# Patient Record
Sex: MALE | Race: WHITE | NOT HISPANIC OR LATINO | Employment: FULL TIME | URBAN - NONMETROPOLITAN AREA
[De-identification: names, ages, dates, MRNs, and addresses within clinical notes are randomized per-mention and may not be internally consistent; named-entity substitution may affect disease eponyms.]

---

## 2022-12-16 ENCOUNTER — TRANSFERRED RECORDS (OUTPATIENT)
Dept: HEALTH INFORMATION MANAGEMENT | Facility: OTHER | Age: 57
End: 2022-12-16

## 2022-12-20 ENCOUNTER — OFFICE VISIT (OUTPATIENT)
Dept: PEDIATRICS | Facility: OTHER | Age: 57
End: 2022-12-20
Attending: INTERNAL MEDICINE
Payer: COMMERCIAL

## 2022-12-20 VITALS
DIASTOLIC BLOOD PRESSURE: 82 MMHG | WEIGHT: 184 LBS | TEMPERATURE: 97.7 F | HEIGHT: 71 IN | OXYGEN SATURATION: 97 % | SYSTOLIC BLOOD PRESSURE: 138 MMHG | RESPIRATION RATE: 20 BRPM | BODY MASS INDEX: 25.76 KG/M2 | HEART RATE: 92 BPM

## 2022-12-20 DIAGNOSIS — Z23 NEED FOR VACCINATION: ICD-10-CM

## 2022-12-20 DIAGNOSIS — K57.30 DIVERTICULOSIS OF LARGE INTESTINE WITHOUT HEMORRHAGE: ICD-10-CM

## 2022-12-20 DIAGNOSIS — K57.32 DIVERTICULITIS OF LARGE INTESTINE WITHOUT PERFORATION OR ABSCESS WITHOUT BLEEDING: Primary | ICD-10-CM

## 2022-12-20 PROCEDURE — 90471 IMMUNIZATION ADMIN: CPT | Performed by: INTERNAL MEDICINE

## 2022-12-20 PROCEDURE — 90682 RIV4 VACC RECOMBINANT DNA IM: CPT | Performed by: INTERNAL MEDICINE

## 2022-12-20 PROCEDURE — 99203 OFFICE O/P NEW LOW 30 MIN: CPT | Mod: 25 | Performed by: INTERNAL MEDICINE

## 2022-12-20 RX ORDER — CIPROFLOXACIN 500 MG/1
500 TABLET, FILM COATED ORAL 2 TIMES DAILY
COMMUNITY
End: 2023-02-21

## 2022-12-20 RX ORDER — CHOLECALCIFEROL (VITAMIN D3) 125 MCG
1 CAPSULE ORAL DAILY
COMMUNITY

## 2022-12-20 RX ORDER — METRONIDAZOLE 500 MG/1
500 TABLET ORAL 3 TIMES DAILY
COMMUNITY
End: 2023-02-21

## 2022-12-20 ASSESSMENT — PAIN SCALES - GENERAL: PAINLEVEL: MILD PAIN (2)

## 2022-12-20 NOTE — NURSING NOTE
"Chief Complaint   Patient presents with     Abdominal Pain     LLQ- recent DX: Diverticulitis 12/16/22.         Initial /82   Pulse 92   Temp 97.7  F (36.5  C) (Tympanic)   Resp 20   Ht 1.791 m (5' 10.5\")   Wt 83.5 kg (184 lb)   SpO2 97%   BMI 26.03 kg/m   Estimated body mass index is 26.03 kg/m  as calculated from the following:    Height as of this encounter: 1.791 m (5' 10.5\").    Weight as of this encounter: 83.5 kg (184 lb).         Norma J. Gosselin, SAMARA   "

## 2022-12-20 NOTE — PATIENT INSTRUCTIONS
-- Complete antibiotics   -- Yogurt daily   -- Fiber daily for life   -- Slowly liberalize diet   -- Schedule colonoscopy about a month out or so

## 2022-12-20 NOTE — PROGRESS NOTES
"Assessment & Plan   1. Diverticulitis of large intestine without perforation or abscess without bleeding  - Adult GI  Referral - Procedure Only; Future    2. Diverticulosis of large intestine without hemorrhage  I recommend a follow-up colonoscopy when symptoms have resolved to look for the possibility of a colon cancer at the site of diverticulitis.  - Adult GI  Referral - Procedure Only; Future    3. Need for vaccination  - INFLUENZA VACCINE 50-64 OR 18-64 W/EGG ALLERGY (FLUBLOK)      Patient Instructions    -- Complete antibiotics   -- Yogurt daily   -- Fiber daily for life   -- Slowly liberalize diet   -- Schedule colonoscopy about a month out or so        Signed, Jos Nunez MD, FAAP, FACP  Internal Medicine & Pediatrics    Subjective   Cliff Alvarez is a 57 year old male who presents for ER follow-up.  He was down in [place] on a hunting trip.  He developed abdominal pain.  He was seen in an emergency room in [place] on the way home.  He was diagnosed with diverticulitis.  He had a CAT scan.  Those records are not available for my review.  He is on antibiotics and feels like he is recovering.  He was told to follow-up with primary care.    Objective   Vitals: /82   Pulse 92   Temp 97.7  F (36.5  C) (Tympanic)   Resp 20   Ht 1.791 m (5' 10.5\")   Wt 83.5 kg (184 lb)   SpO2 97%   BMI 26.03 kg/m      Abdomen: Soft, nondistended.  Mild diffuse tenderness without rebound or guarding.    Review and Analysis of Data   I personally reviewed the following:  External notes: No  Results: No  Use of an independent historian: No  Independent review of a test performed by another physician: No  Discussion of management with another physician: No  Moderate risk of morbidity from additional diagnostic testing and/or treatment.      "

## 2023-02-15 DIAGNOSIS — Z87.19 HISTORY OF DIVERTICULITIS: Primary | ICD-10-CM

## 2023-02-15 NOTE — TELEPHONE ENCOUNTER
Screening Questions for the Scheduling of Screening Colonoscopies   (If Colonoscopy is diagnostic, Provider should review the chart before scheduling.)  Are you younger than 50 or older than 80?  NO  Do you take aspirin or fish oil?  NO (if yes, tell patient to stop 1 week prior to Colonoscopy)  Do you take warfarin (Coumadin), clopidogrel (Plavix), apixaban (Eliquis), dabigatram (Pradaxa), rivaroxaban (Xarelto) or any blood thinner? NO  Do you use oxygen at home?  NO  Do you have kidney disease? NO  Are you on dialysis? NO  Have you had a stroke or heart attack in the last year? NO  Have you had a stent in your heart or any blood vessel in the last year? NO  Have you had a transplant of any organ? NO  Have you had a colonoscopy or upper endoscopy (EGD) before? YES         When?  6 YEARS [facility]  Date of scheduled Colonoscopy. 02/28/2023  Provider [facility]  Shayna Toledo on 2/15/2023 at 8:04 AM

## 2023-02-16 RX ORDER — BISACODYL 5 MG
TABLET, DELAYED RELEASE (ENTERIC COATED) ORAL
Qty: 2 TABLET | Refills: 0 | Status: ON HOLD | OUTPATIENT
Start: 2023-02-16 | End: 2023-02-28

## 2023-02-16 RX ORDER — POLYETHYLENE GLYCOL 3350, SODIUM CHLORIDE, SODIUM BICARBONATE, POTASSIUM CHLORIDE 420; 11.2; 5.72; 1.48 G/4L; G/4L; G/4L; G/4L
4000 POWDER, FOR SOLUTION ORAL ONCE
Qty: 4000 ML | Refills: 0 | Status: SHIPPED | OUTPATIENT
Start: 2023-02-16 | End: 2023-02-16

## 2023-02-28 ENCOUNTER — ANESTHESIA EVENT (OUTPATIENT)
Dept: SURGERY | Facility: OTHER | Age: 58
End: 2023-02-28
Payer: COMMERCIAL

## 2023-02-28 ENCOUNTER — ANESTHESIA (OUTPATIENT)
Dept: SURGERY | Facility: OTHER | Age: 58
End: 2023-02-28
Payer: COMMERCIAL

## 2023-02-28 ENCOUNTER — HOSPITAL ENCOUNTER (OUTPATIENT)
Facility: OTHER | Age: 58
Discharge: HOME | End: 2023-02-28
Attending: SURGERY | Admitting: SURGERY
Payer: COMMERCIAL

## 2023-02-28 VITALS
BODY MASS INDEX: 25.46 KG/M2 | WEIGHT: 180 LBS | RESPIRATION RATE: 16 BRPM | DIASTOLIC BLOOD PRESSURE: 99 MMHG | TEMPERATURE: 96.5 F | HEART RATE: 56 BPM | OXYGEN SATURATION: 98 % | SYSTOLIC BLOOD PRESSURE: 127 MMHG

## 2023-02-28 PROCEDURE — 258N000003 HC RX IP 258 OP 636: Performed by: SURGERY

## 2023-02-28 PROCEDURE — G0121 COLON CA SCRN NOT HI RSK IND: HCPCS | Performed by: SURGERY

## 2023-02-28 PROCEDURE — 999N000010 HC STATISTIC ANES STAT CODE-CRNA PER MINUTE: Performed by: SURGERY

## 2023-02-28 PROCEDURE — 45378 DIAGNOSTIC COLONOSCOPY: CPT | Performed by: NURSE ANESTHETIST, CERTIFIED REGISTERED

## 2023-02-28 PROCEDURE — 250N000011 HC RX IP 250 OP 636: Performed by: NURSE ANESTHETIST, CERTIFIED REGISTERED

## 2023-02-28 PROCEDURE — 45378 DIAGNOSTIC COLONOSCOPY: CPT | Performed by: SURGERY

## 2023-02-28 PROCEDURE — 250N000009 HC RX 250: Performed by: NURSE ANESTHETIST, CERTIFIED REGISTERED

## 2023-02-28 RX ORDER — PROPOFOL 10 MG/ML
INJECTION, EMULSION INTRAVENOUS CONTINUOUS PRN
Status: DISCONTINUED | OUTPATIENT
Start: 2023-02-28 | End: 2023-02-28

## 2023-02-28 RX ORDER — PROPOFOL 10 MG/ML
INJECTION, EMULSION INTRAVENOUS PRN
Status: DISCONTINUED | OUTPATIENT
Start: 2023-02-28 | End: 2023-02-28

## 2023-02-28 RX ORDER — LIDOCAINE 40 MG/G
CREAM TOPICAL
Status: DISCONTINUED | OUTPATIENT
Start: 2023-02-28 | End: 2023-02-28 | Stop reason: HOSPADM

## 2023-02-28 RX ORDER — NALOXONE HYDROCHLORIDE 0.4 MG/ML
0.4 INJECTION, SOLUTION INTRAMUSCULAR; INTRAVENOUS; SUBCUTANEOUS
Status: CANCELLED | OUTPATIENT
Start: 2023-02-28

## 2023-02-28 RX ORDER — NALOXONE HYDROCHLORIDE 0.4 MG/ML
0.2 INJECTION, SOLUTION INTRAMUSCULAR; INTRAVENOUS; SUBCUTANEOUS
Status: CANCELLED | OUTPATIENT
Start: 2023-02-28

## 2023-02-28 RX ORDER — FLUMAZENIL 0.1 MG/ML
0.2 INJECTION, SOLUTION INTRAVENOUS
Status: CANCELLED | OUTPATIENT
Start: 2023-02-28 | End: 2023-03-01

## 2023-02-28 RX ORDER — SODIUM CHLORIDE, SODIUM LACTATE, POTASSIUM CHLORIDE, CALCIUM CHLORIDE 600; 310; 30; 20 MG/100ML; MG/100ML; MG/100ML; MG/100ML
INJECTION, SOLUTION INTRAVENOUS CONTINUOUS
Status: DISCONTINUED | OUTPATIENT
Start: 2023-02-28 | End: 2023-02-28 | Stop reason: HOSPADM

## 2023-02-28 RX ORDER — LIDOCAINE HYDROCHLORIDE 20 MG/ML
INJECTION, SOLUTION INFILTRATION; PERINEURAL PRN
Status: DISCONTINUED | OUTPATIENT
Start: 2023-02-28 | End: 2023-02-28

## 2023-02-28 RX ADMIN — PROPOFOL 140 MCG/KG/MIN: 10 INJECTION, EMULSION INTRAVENOUS at 12:00

## 2023-02-28 RX ADMIN — SODIUM CHLORIDE, POTASSIUM CHLORIDE, SODIUM LACTATE AND CALCIUM CHLORIDE: 600; 310; 30; 20 INJECTION, SOLUTION INTRAVENOUS at 10:56

## 2023-02-28 RX ADMIN — LIDOCAINE HYDROCHLORIDE 40 MG: 20 INJECTION, SOLUTION INFILTRATION; PERINEURAL at 12:00

## 2023-02-28 RX ADMIN — PROPOFOL 80 MG: 10 INJECTION, EMULSION INTRAVENOUS at 12:00

## 2023-02-28 ASSESSMENT — ACTIVITIES OF DAILY LIVING (ADL)
ADLS_ACUITY_SCORE: 35
ADLS_ACUITY_SCORE: 35

## 2023-02-28 NOTE — OP NOTE
COLONOSCOPY PROCEDURE NOTE    DATE OF SERVICE: 2/28/2023    SURGEON: MARIAH Dobson MD     PRE-OP DIAGNOSIS:    Healthcare maintenance   History of diverticulitis     POST-OP DIAGNOSIS:    Sigmoid Diverticulosis    PROCEDURE:   Colonoscopy    ASSISTANT:  Circulator: Scott Peralta RN  Scrub Person: Beba Mac    ANESTHESIA:  MAC                            MACCRNA Independent: Flora Teresa APRN CRNA    INDICATION FOR THE PROCEDURE: The patient is a 57 year old male with history of diverticulitis. The patient has no other complaints.  After explaining the risks to include bleeding, perforation, potential inability to reach the cecum the patient wishes to proceed.    PROCEDURE: After adequate sedation, the patient was in the left lateral decubitus position.  Rectal exam was performed.  There was normal tone and no palpable masses.  The colonoscope was introduced into the rectum and advanced to the cecum with Mild difficulty.  The patient's prep was good.  The terminal cecum was reached.  The cecum, ascending, transverse, descending and sigmoid colon were significant for mild sigmoid diverticulosis - no evidence of active inflammation.  The scope was retroflexed in the rectum.  The anorectal junction was unremarkable.  The scope was straightened and removed.  The patient tolerated the procedure well.     ESTIMATED BLOOD LOSS: none    COMPLICATIONS:  None    TISSUE REMOVED:  No    RECOMMEND:    Follow-up in 10 years  Fiber  Given literature on diverticulosis      MARIAH Dobson MD            Pt presents with c/o dry cough and abdominal pain and vomiting. Family member is at bedside.

## 2023-02-28 NOTE — H&P
GENERAL SURGERY CONSULTATION NOTE    Cliff Alvarez   [address]  [facility] [id number]  57 year old  male    Primary Care Provider:  No Ref-Primary, Physician      HPI: Cliff Alvarez presents to day surgery in need of colonoscopy for screening for colon cancer and follow up diverticulitis.   Cliff Alvarez has family history of colon cancer, grandfather. Patient denies change in bowel habits or blood in stools. Previous colonoscopy was 5-6 yr ago.     REVIEW OF SYSTEMS:    GENERAL: No fevers or chills. Denies fatigue, recent weight loss.  HEENT: No sinus drainage. No changes with vision or hearing. No difficulty swallowing.   LYMPHATICS:  Noswollen nodes in axilla, neck or groin.  CARDIOVASCULAR: Denies chest pain, palpitations and dyspnea on exertion.  PULMONARY: No shortness of breath or cough. No increase in sputum production.  GI: Denies melena,bright red blood in stools. No hematemesis. No constipation or diarrhea.  : No dysuria or hematuria.  SKIN: No recent rashes or ulcers.   HEMATOLOGY:  No history of easy bruising or bleeding.  ENDOCRINE:  No history of diabetes or thyroid problems.  NEUROLOGY:  No history of seizures or headaches. No motor or sensory changes.        Patient Active Problem List   Diagnosis     Diverticulosis of large intestine without hemorrhage       History reviewed. No pertinent past medical history.    History reviewed. No pertinent surgical history.    History reviewed. No pertinent family history.    Social History     Social History Narrative     Not on file       Social History     Socioeconomic History     Marital status: Single     Spouse name: Not on file     Number of children: Not on file     Years of education: Not on file     Highest education level: Not on file   Occupational History     Not on file   Tobacco Use     Smoking status: Not on file     Smokeless tobacco: Not on file   Substance and Sexual Activity     Alcohol use: Not on file     Drug use: Not on file      Sexual activity: Not on file   Other Topics Concern     Not on file   Social History Narrative     Not on file     Social Determinants of Health     Financial Resource Strain: Not on file   Food Insecurity: Not on file   Transportation Needs: Not on file   Physical Activity: Not on file   Stress: Not on file   Social Connections: Not on file   Intimate Partner Violence: Not on file   Housing Stability: Not on file       No current facility-administered medications on file prior to encounter.  Lactobacillus (PROBIOTIC ACIDOPHILUS) CAPS, Take 1 capsule by mouth daily  psyllium (METAMUCIL/KONSYL) 58.6 % powder, Take by mouth daily Two teaspoon daily          ALLERGIES/SENSITIVITIES:   Allergies   Allergen Reactions     Doxycycline Itching       PHYSICAL EXAM:     /89   Pulse 89   Temp 97.1  F (36.2  C)   Resp 16   Wt 81.6 kg (180 lb)   SpO2 100%   BMI 25.46 kg/m      General Appearance:   Sitting up in bed, no apparent distress  HEENT: Pupils are equal and reactive, no scleral icterus   Heart & CV:  RRR, no murmur.  LUNGS: No increased work of breathing. Lungs are CTA B/L, no wheezing or crackles.  Abd:  soft, non-tender, no masses   Ext: no lower extremity edema   Neuro: alert and oriented, normal speech and mentation         CONSULTATION ASSESSMENT AND PLAN:    57 year old male with average risk for colon cancer.      The technical details of colonoscopy were discussed with the patient along with the risks and benefits to include bleeding, perforation and incomplete study. lCiff Alvarez demonstrated understanding and is willing to proceed.       Dany Dobson MD on 2/28/2023 at 11:44 AM

## 2023-02-28 NOTE — OR NURSING
Cliff has been discharged to home at 1322 via ambulatory accompanied by JANICE Payan & Jeremias (friend)     Written discharge instructions were provided to patient.  Prescriptions were N/A.  Patient states their pain is 0, and denies any nausea or dizziness upon discharge.    Patient and adult caring for them verbalize understanding of discharge instructions including no driving until tomorrow and no longer taking narcotic pain medications - no operating mechanical equipment and no making any important decisions.They understand reason for discharge, and necessary follow-up appointments.      Ora Garsia RN on 2/28/2023 at 1:34 PM

## 2023-02-28 NOTE — DISCHARGE INSTRUCTIONS
[facility]  Adult Discharge Orders & Instructions    ________________________________________________________________          For 12 hours after surgery  Get plenty of rest.  A responsible adult must stay with you for at least 12 hours after you leave the hospital.   You may feel lightheaded.  IF so, sit for a few minutes before standing.  Have someone help you get up.   You may have a slight fever. Call the doctor if your fever is over 101 F (38.3 C) (taken under the tongue) or lasts longer than 24 hours.  You may have a dry mouth, a sore throat, muscle aches or trouble sleeping.  These should go away after 24 hours.  Do not make important or legal decisions.  6.   Do not drive or use heavy equipment.  If you have weakness or tingling, don't drive or use heavy equipment until this feeling goes away.    To contact a doctor, call   [phone]_______________________

## 2023-02-28 NOTE — ANESTHESIA POSTPROCEDURE EVALUATION
Patient: Cliff Alvarez    Procedure: Procedure(s):  COLONOSCOPY       Anesthesia Type:  MAC    Note:  Disposition: Outpatient   Postop Pain Control: Uneventful            Sign Out: Well controlled pain   PONV: No   Neuro/Psych: Uneventful            Sign Out: Acceptable/Baseline neuro status   Airway/Respiratory: Uneventful            Sign Out: Acceptable/Baseline resp. status   CV/Hemodynamics: Uneventful            Sign Out: Acceptable CV status; No obvious hypovolemia; No obvious fluid overload   Other NRE: NONE   DID A NON-ROUTINE EVENT OCCUR? No           Last vitals:  Vitals Value Taken Time   /99 02/28/23 1300   Temp 96.5  F (35.8  C) 02/28/23 1227   Pulse 56 02/28/23 1300   Resp 16 02/28/23 1227   SpO2 97 % 02/28/23 1313   Vitals shown include unvalidated device data.    Electronically Signed By: STEPHANIE Begum CRNA  February 28, 2023  2:19 PM

## 2023-02-28 NOTE — ANESTHESIA PREPROCEDURE EVALUATION
Anesthesia Pre-Procedure Evaluation    Patient: Cliff Alvarez   MRN: [id number] : 1965        Procedure : Procedure(s):  COLONOSCOPY          History reviewed. No pertinent past medical history.   History reviewed. No pertinent surgical history.   Allergies   Allergen Reactions     Doxycycline Itching      Social History     Tobacco Use     Smoking status: Not on file     Smokeless tobacco: Not on file   Substance Use Topics     Alcohol use: Not on file      Wt Readings from Last 1 Encounters:   23 81.6 kg (180 lb)        Anesthesia Evaluation   Pt has had prior anesthetic.     No history of anesthetic complications       ROS/MED HX  ENT/Pulmonary:       Neurologic:       Cardiovascular:       METS/Exercise Tolerance: >4 METS    Hematologic:       Musculoskeletal:       GI/Hepatic:       Renal/Genitourinary:       Endo:       Psychiatric/Substance Use:       Infectious Disease:       Malignancy:       Other:            Physical Exam    Airway  airway exam normal      Mallampati: II   TM distance: > 3 FB   Neck ROM: full   Mouth opening: > 3 cm    Respiratory Devices and Support         Dental       (+) Minor Abnormalities - some fillings, tiny chips      Cardiovascular   cardiovascular exam normal       Rhythm and rate: regular and normal     Pulmonary   pulmonary exam normal        breath sounds clear to auscultation           OUTSIDE LABS:  CBC: No results found for: WBC, HGB, HCT, PLT  BMP: No results found for: NA, POTASSIUM, CHLORIDE, CO2, BUN, CR, GLC  COAGS: No results found for: PTT, INR, FIBR  POC: No results found for: BGM, HCG, HCGS  HEPATIC: No results found for: ALBUMIN, PROTTOTAL, ALT, AST, GGT, ALKPHOS, BILITOTAL, BILIDIRECT, SUSANA  OTHER: No results found for: PH, LACT, A1C, EMMANUELLE, PHOS, MAG, LIPASE, AMYLASE, TSH, T4, T3, CRP, SED    Anesthesia Plan    ASA Status:  1   NPO Status:  NPO Appropriate    Anesthesia Type: MAC.     - Reason for MAC: straight local not clinically adequate    Induction: Intravenous.   Maintenance: Balanced.        Consents    Anesthesia Plan(s) and associated risks, benefits, and realistic alternatives discussed. Questions answered and patient/representative(s) expressed understanding.    - Discussed: Risks, Benefits and Alternatives for BOTH SEDATION and the PROCEDURE were discussed     - Discussed with:          Postoperative Care            Comments:    Other Comments: Risks, benefits and alternatives discussed and would like to proceed. General anesthesia ok if indicated.             STEPHANIE Begum CRNA

## 2023-02-28 NOTE — ANESTHESIA CARE TRANSFER NOTE
Patient: Cliff Alvarez    Procedure: Procedure(s):  COLONOSCOPY       Diagnosis: Encounter for screening colonoscopy [Z12.11]  Diagnosis Additional Information: No value filed.    Anesthesia Type:   MAC     Note:    Oropharynx: oropharynx clear of all foreign objects  Level of Consciousness: awake  Oxygen Supplementation: room air    Independent Airway: airway patency satisfactory and stable  Dentition: dentition unchanged  Vital Signs Stable: post-procedure vital signs reviewed and stable  Report to RN Given: handoff report given  Patient transferred to: Phase II    Handoff Report: Identifed the Patient, Identified the Reponsible Provider, Reviewed the pertinent medical history, Discussed the surgical course, Reviewed Intra-OP anesthesia mangement and issues during anesthesia, Set expectations for post-procedure period and Allowed opportunity for questions and acknowledgement of understanding      Vitals:  Vitals Value Taken Time   /99 02/28/23 1300   Temp 96.5  F (35.8  C) 02/28/23 1227   Pulse 56 02/28/23 1300   Resp 16 02/28/23 1227   SpO2 97 % 02/28/23 1313   Vitals shown include unvalidated device data.    Electronically Signed By: STEPHANIE Begum CRNA  February 28, 2023  2:20 PM

## 2023-05-06 ENCOUNTER — HEALTH MAINTENANCE LETTER (OUTPATIENT)
Age: 58
End: 2023-05-06

## 2024-07-13 ENCOUNTER — HEALTH MAINTENANCE LETTER (OUTPATIENT)
Age: 59
End: 2024-07-13

## 2025-07-19 ENCOUNTER — HEALTH MAINTENANCE LETTER (OUTPATIENT)
Age: 60
End: 2025-07-19

## (undated) DEVICE — SOL WATER 1500ML

## (undated) DEVICE — ENDO KIT COMPLIANCE DYKENDOCMPLY

## (undated) DEVICE — ENDO BRUSH CHANNEL MASTER CLEANING 2-4.2MM BW-412T

## (undated) DEVICE — SUCTION MANIFOLD NEPTUNE 2 SYS 4 PORT 0702-020-000

## (undated) DEVICE — TUBING SUCTION 10'X3/16" N510